# Patient Record
Sex: MALE | Race: WHITE | Employment: OTHER | URBAN - METROPOLITAN AREA
[De-identification: names, ages, dates, MRNs, and addresses within clinical notes are randomized per-mention and may not be internally consistent; named-entity substitution may affect disease eponyms.]

---

## 2020-01-31 ENCOUNTER — APPOINTMENT (OUTPATIENT)
Dept: CT IMAGING | Facility: CLINIC | Age: 70
End: 2020-01-31
Attending: EMERGENCY MEDICINE
Payer: COMMERCIAL

## 2020-01-31 ENCOUNTER — ANESTHESIA (OUTPATIENT)
Dept: SURGERY | Facility: CLINIC | Age: 70
End: 2020-01-31
Payer: COMMERCIAL

## 2020-01-31 ENCOUNTER — ANESTHESIA EVENT (OUTPATIENT)
Dept: SURGERY | Facility: CLINIC | Age: 70
End: 2020-01-31
Payer: COMMERCIAL

## 2020-01-31 ENCOUNTER — HOSPITAL ENCOUNTER (OUTPATIENT)
Facility: CLINIC | Age: 70
Discharge: HOME | End: 2020-01-31
Attending: EMERGENCY MEDICINE | Admitting: SURGERY
Payer: COMMERCIAL

## 2020-01-31 VITALS
SYSTOLIC BLOOD PRESSURE: 155 MMHG | HEART RATE: 51 BPM | BODY MASS INDEX: 28.48 KG/M2 | RESPIRATION RATE: 20 BRPM | TEMPERATURE: 98.4 F | OXYGEN SATURATION: 97 % | DIASTOLIC BLOOD PRESSURE: 76 MMHG | WEIGHT: 210 LBS

## 2020-01-31 DIAGNOSIS — K43.6 INCARCERATED VENTRAL HERNIA: ICD-10-CM

## 2020-01-31 LAB
ALBUMIN SERPL-MCNC: 3.9 G/DL (ref 3.4–5)
ALBUMIN UR-MCNC: NEGATIVE MG/DL
ALP SERPL-CCNC: 85 U/L (ref 40–150)
ALT SERPL W P-5'-P-CCNC: 25 U/L (ref 0–70)
ANION GAP SERPL CALCULATED.3IONS-SCNC: 8 MMOL/L (ref 3–14)
APPEARANCE UR: CLEAR
AST SERPL W P-5'-P-CCNC: 27 U/L (ref 0–45)
BASOPHILS # BLD AUTO: 0.1 10E9/L (ref 0–0.2)
BASOPHILS NFR BLD AUTO: 0.4 %
BILIRUB SERPL-MCNC: 0.5 MG/DL (ref 0.2–1.3)
BILIRUB UR QL STRIP: NEGATIVE
BUN SERPL-MCNC: 21 MG/DL (ref 7–30)
CALCIUM SERPL-MCNC: 9.2 MG/DL (ref 8.5–10.1)
CHLORIDE SERPL-SCNC: 105 MMOL/L (ref 94–109)
CO2 SERPL-SCNC: 26 MMOL/L (ref 20–32)
COLOR UR AUTO: ABNORMAL
CREAT SERPL-MCNC: 0.84 MG/DL (ref 0.66–1.25)
DIFFERENTIAL METHOD BLD: ABNORMAL
EOSINOPHIL # BLD AUTO: 0.1 10E9/L (ref 0–0.7)
EOSINOPHIL NFR BLD AUTO: 0.4 %
ERYTHROCYTE [DISTWIDTH] IN BLOOD BY AUTOMATED COUNT: 11.8 % (ref 10–15)
GFR SERPL CREATININE-BSD FRML MDRD: >90 ML/MIN/{1.73_M2}
GLUCOSE SERPL-MCNC: 152 MG/DL (ref 70–99)
GLUCOSE UR STRIP-MCNC: NEGATIVE MG/DL
HCT VFR BLD AUTO: 44.3 % (ref 40–53)
HGB BLD-MCNC: 15 G/DL (ref 13.3–17.7)
HGB UR QL STRIP: NEGATIVE
IMM GRANULOCYTES # BLD: 0.1 10E9/L (ref 0–0.4)
IMM GRANULOCYTES NFR BLD: 0.5 %
KETONES UR STRIP-MCNC: 40 MG/DL
LEUKOCYTE ESTERASE UR QL STRIP: NEGATIVE
LYMPHOCYTES # BLD AUTO: 1.6 10E9/L (ref 0.8–5.3)
LYMPHOCYTES NFR BLD AUTO: 10 %
MCH RBC QN AUTO: 31.1 PG (ref 26.5–33)
MCHC RBC AUTO-ENTMCNC: 33.9 G/DL (ref 31.5–36.5)
MCV RBC AUTO: 92 FL (ref 78–100)
MONOCYTES # BLD AUTO: 0.5 10E9/L (ref 0–1.3)
MONOCYTES NFR BLD AUTO: 3.3 %
MUCOUS THREADS #/AREA URNS LPF: PRESENT /LPF
NEUTROPHILS # BLD AUTO: 14 10E9/L (ref 1.6–8.3)
NEUTROPHILS NFR BLD AUTO: 85.4 %
NITRATE UR QL: NEGATIVE
NRBC # BLD AUTO: 0 10*3/UL
NRBC BLD AUTO-RTO: 0 /100
PH UR STRIP: 8 PH (ref 5–7)
PLATELET # BLD AUTO: 291 10E9/L (ref 150–450)
POTASSIUM SERPL-SCNC: 3.9 MMOL/L (ref 3.4–5.3)
PROT SERPL-MCNC: 7.8 G/DL (ref 6.8–8.8)
RBC # BLD AUTO: 4.82 10E12/L (ref 4.4–5.9)
RBC #/AREA URNS AUTO: 2 /HPF (ref 0–2)
SODIUM SERPL-SCNC: 139 MMOL/L (ref 133–144)
SOURCE: ABNORMAL
SP GR UR STRIP: 1 (ref 1–1.03)
UROBILINOGEN UR STRIP-MCNC: NORMAL MG/DL (ref 0–2)
WBC # BLD AUTO: 16.4 10E9/L (ref 4–11)
WBC #/AREA URNS AUTO: 13 /HPF (ref 0–5)

## 2020-01-31 PROCEDURE — 71000012 ZZH RECOVERY PHASE 1 LEVEL 1 FIRST HR: Performed by: SURGERY

## 2020-01-31 PROCEDURE — 25000128 H RX IP 250 OP 636: Performed by: EMERGENCY MEDICINE

## 2020-01-31 PROCEDURE — 71000027 ZZH RECOVERY PHASE 2 EACH 15 MINS: Performed by: SURGERY

## 2020-01-31 PROCEDURE — 25000128 H RX IP 250 OP 636: Performed by: SURGERY

## 2020-01-31 PROCEDURE — 25000128 H RX IP 250 OP 636: Performed by: NURSE ANESTHETIST, CERTIFIED REGISTERED

## 2020-01-31 PROCEDURE — 99204 OFFICE O/P NEW MOD 45 MIN: CPT | Mod: 57 | Performed by: SURGERY

## 2020-01-31 PROCEDURE — 49561 ZZHC REPAIR INITIAL INCISIONAL HERNIA; INCARCERATED OR STRANGULATED: CPT | Performed by: SURGERY

## 2020-01-31 PROCEDURE — 49568 ZZHC REPAIR HERNIA WITH MESH: CPT | Performed by: SURGERY

## 2020-01-31 PROCEDURE — 37000008 ZZH ANESTHESIA TECHNICAL FEE, 1ST 30 MIN: Performed by: SURGERY

## 2020-01-31 PROCEDURE — 49561 ZZHC REPAIR INITIAL INCISIONAL HERNIA; INCARCERATED OR STRANGULATED: CPT | Mod: AS | Performed by: PHYSICIAN ASSISTANT

## 2020-01-31 PROCEDURE — 37000009 ZZH ANESTHESIA TECHNICAL FEE, EACH ADDTL 15 MIN: Performed by: SURGERY

## 2020-01-31 PROCEDURE — 74177 CT ABD & PELVIS W/CONTRAST: CPT

## 2020-01-31 PROCEDURE — 40000306 ZZH STATISTIC PRE PROC ASSESS II: Performed by: SURGERY

## 2020-01-31 PROCEDURE — 99285 EMERGENCY DEPT VISIT HI MDM: CPT | Mod: 25

## 2020-01-31 PROCEDURE — 25000125 ZZHC RX 250: Performed by: NURSE ANESTHETIST, CERTIFIED REGISTERED

## 2020-01-31 PROCEDURE — 25800030 ZZH RX IP 258 OP 636: Performed by: ANESTHESIOLOGY

## 2020-01-31 PROCEDURE — 96376 TX/PRO/DX INJ SAME DRUG ADON: CPT

## 2020-01-31 PROCEDURE — 93010 ELECTROCARDIOGRAM REPORT: CPT | Performed by: INTERNAL MEDICINE

## 2020-01-31 PROCEDURE — 25800030 ZZH RX IP 258 OP 636: Performed by: EMERGENCY MEDICINE

## 2020-01-31 PROCEDURE — 96375 TX/PRO/DX INJ NEW DRUG ADDON: CPT | Mod: 59

## 2020-01-31 PROCEDURE — 49568 ZZHC REPAIR HERNIA WITH MESH: CPT | Mod: AS | Performed by: PHYSICIAN ASSISTANT

## 2020-01-31 PROCEDURE — 25000125 ZZHC RX 250: Performed by: EMERGENCY MEDICINE

## 2020-01-31 PROCEDURE — 27210794 ZZH OR GENERAL SUPPLY STERILE: Performed by: SURGERY

## 2020-01-31 PROCEDURE — 36000056 ZZH SURGERY LEVEL 3 1ST 30 MIN: Performed by: SURGERY

## 2020-01-31 PROCEDURE — 85025 COMPLETE CBC W/AUTO DIFF WBC: CPT | Performed by: EMERGENCY MEDICINE

## 2020-01-31 PROCEDURE — 96374 THER/PROPH/DIAG INJ IV PUSH: CPT | Mod: 59

## 2020-01-31 PROCEDURE — 81001 URINALYSIS AUTO W/SCOPE: CPT | Performed by: EMERGENCY MEDICINE

## 2020-01-31 PROCEDURE — 36000058 ZZH SURGERY LEVEL 3 EA 15 ADDTL MIN: Performed by: SURGERY

## 2020-01-31 PROCEDURE — 80053 COMPREHEN METABOLIC PANEL: CPT | Performed by: EMERGENCY MEDICINE

## 2020-01-31 RX ORDER — FENTANYL CITRATE 50 UG/ML
25-50 INJECTION, SOLUTION INTRAMUSCULAR; INTRAVENOUS
Status: DISCONTINUED | OUTPATIENT
Start: 2020-01-31 | End: 2020-01-31 | Stop reason: HOSPADM

## 2020-01-31 RX ORDER — EPHEDRINE SULFATE 50 MG/ML
INJECTION, SOLUTION INTRAVENOUS PRN
Status: DISCONTINUED | OUTPATIENT
Start: 2020-01-31 | End: 2020-01-31

## 2020-01-31 RX ORDER — GLYCOPYRROLATE 0.2 MG/ML
INJECTION, SOLUTION INTRAMUSCULAR; INTRAVENOUS PRN
Status: DISCONTINUED | OUTPATIENT
Start: 2020-01-31 | End: 2020-01-31

## 2020-01-31 RX ORDER — ONDANSETRON 2 MG/ML
4 INJECTION INTRAMUSCULAR; INTRAVENOUS EVERY 30 MIN PRN
Status: DISCONTINUED | OUTPATIENT
Start: 2020-01-31 | End: 2020-01-31 | Stop reason: HOSPADM

## 2020-01-31 RX ORDER — OXYCODONE HYDROCHLORIDE 5 MG/1
5-10 TABLET ORAL EVERY 4 HOURS PRN
Qty: 12 TABLET | Refills: 0 | Status: SHIPPED | OUTPATIENT
Start: 2020-01-31 | End: 2020-02-18

## 2020-01-31 RX ORDER — IOPAMIDOL 755 MG/ML
500 INJECTION, SOLUTION INTRAVASCULAR ONCE
Status: COMPLETED | OUTPATIENT
Start: 2020-01-31 | End: 2020-01-31

## 2020-01-31 RX ORDER — DEXAMETHASONE SODIUM PHOSPHATE 4 MG/ML
INJECTION, SOLUTION INTRA-ARTICULAR; INTRALESIONAL; INTRAMUSCULAR; INTRAVENOUS; SOFT TISSUE PRN
Status: DISCONTINUED | OUTPATIENT
Start: 2020-01-31 | End: 2020-01-31

## 2020-01-31 RX ORDER — CEFAZOLIN SODIUM 1 G/3ML
1 INJECTION, POWDER, FOR SOLUTION INTRAMUSCULAR; INTRAVENOUS SEE ADMIN INSTRUCTIONS
Status: DISCONTINUED | OUTPATIENT
Start: 2020-01-31 | End: 2020-01-31 | Stop reason: HOSPADM

## 2020-01-31 RX ORDER — CEFAZOLIN SODIUM 2 G/100ML
2 INJECTION, SOLUTION INTRAVENOUS
Status: COMPLETED | OUTPATIENT
Start: 2020-01-31 | End: 2020-01-31

## 2020-01-31 RX ORDER — IBUPROFEN 200 MG
600 TABLET ORAL EVERY 6 HOURS PRN
Qty: 100 TABLET | Refills: 0 | Status: SHIPPED | OUTPATIENT
Start: 2020-01-31

## 2020-01-31 RX ORDER — SODIUM CHLORIDE, SODIUM LACTATE, POTASSIUM CHLORIDE, CALCIUM CHLORIDE 600; 310; 30; 20 MG/100ML; MG/100ML; MG/100ML; MG/100ML
INJECTION, SOLUTION INTRAVENOUS CONTINUOUS
Status: DISCONTINUED | OUTPATIENT
Start: 2020-01-31 | End: 2020-01-31 | Stop reason: HOSPADM

## 2020-01-31 RX ORDER — PROPOFOL 10 MG/ML
INJECTION, EMULSION INTRAVENOUS PRN
Status: DISCONTINUED | OUTPATIENT
Start: 2020-01-31 | End: 2020-01-31

## 2020-01-31 RX ORDER — METOPROLOL TARTRATE 1 MG/ML
1-2 INJECTION, SOLUTION INTRAVENOUS EVERY 5 MIN PRN
Status: DISCONTINUED | OUTPATIENT
Start: 2020-01-31 | End: 2020-01-31 | Stop reason: HOSPADM

## 2020-01-31 RX ORDER — MEPERIDINE HYDROCHLORIDE 50 MG/ML
12.5 INJECTION INTRAMUSCULAR; INTRAVENOUS; SUBCUTANEOUS
Status: DISCONTINUED | OUTPATIENT
Start: 2020-01-31 | End: 2020-01-31 | Stop reason: HOSPADM

## 2020-01-31 RX ORDER — OXYCODONE HYDROCHLORIDE 5 MG/1
10 TABLET ORAL
Status: DISCONTINUED | OUTPATIENT
Start: 2020-01-31 | End: 2020-01-31 | Stop reason: HOSPADM

## 2020-01-31 RX ORDER — LIDOCAINE 40 MG/G
CREAM TOPICAL
Status: DISCONTINUED | OUTPATIENT
Start: 2020-01-31 | End: 2020-01-31 | Stop reason: HOSPADM

## 2020-01-31 RX ORDER — DIMENHYDRINATE 50 MG/ML
25 INJECTION, SOLUTION INTRAMUSCULAR; INTRAVENOUS
Status: DISCONTINUED | OUTPATIENT
Start: 2020-01-31 | End: 2020-01-31 | Stop reason: HOSPADM

## 2020-01-31 RX ORDER — ALBUTEROL SULFATE 0.83 MG/ML
2.5 SOLUTION RESPIRATORY (INHALATION) EVERY 4 HOURS PRN
Status: DISCONTINUED | OUTPATIENT
Start: 2020-01-31 | End: 2020-01-31 | Stop reason: HOSPADM

## 2020-01-31 RX ORDER — LIDOCAINE HYDROCHLORIDE 10 MG/ML
INJECTION, SOLUTION INFILTRATION; PERINEURAL PRN
Status: DISCONTINUED | OUTPATIENT
Start: 2020-01-31 | End: 2020-01-31

## 2020-01-31 RX ORDER — NEOSTIGMINE METHYLSULFATE 1 MG/ML
VIAL (ML) INJECTION PRN
Status: DISCONTINUED | OUTPATIENT
Start: 2020-01-31 | End: 2020-01-31

## 2020-01-31 RX ORDER — CALCIUM CARBONATE 500(1250)
1 TABLET ORAL DAILY
COMMUNITY

## 2020-01-31 RX ORDER — FENTANYL CITRATE 50 UG/ML
INJECTION, SOLUTION INTRAMUSCULAR; INTRAVENOUS PRN
Status: DISCONTINUED | OUTPATIENT
Start: 2020-01-31 | End: 2020-01-31

## 2020-01-31 RX ORDER — NALOXONE HYDROCHLORIDE 0.4 MG/ML
.1-.4 INJECTION, SOLUTION INTRAMUSCULAR; INTRAVENOUS; SUBCUTANEOUS
Status: DISCONTINUED | OUTPATIENT
Start: 2020-01-31 | End: 2020-01-31 | Stop reason: HOSPADM

## 2020-01-31 RX ORDER — SODIUM CHLORIDE 9 MG/ML
INJECTION, SOLUTION INTRAVENOUS CONTINUOUS
Status: DISCONTINUED | OUTPATIENT
Start: 2020-01-31 | End: 2020-01-31 | Stop reason: HOSPADM

## 2020-01-31 RX ORDER — HYDROMORPHONE HYDROCHLORIDE 1 MG/ML
0.5 INJECTION, SOLUTION INTRAMUSCULAR; INTRAVENOUS; SUBCUTANEOUS
Status: DISCONTINUED | OUTPATIENT
Start: 2020-01-31 | End: 2020-01-31 | Stop reason: HOSPADM

## 2020-01-31 RX ORDER — BUPIVACAINE HYDROCHLORIDE 5 MG/ML
INJECTION, SOLUTION EPIDURAL; INTRACAUDAL PRN
Status: DISCONTINUED | OUTPATIENT
Start: 2020-01-31 | End: 2020-01-31 | Stop reason: HOSPADM

## 2020-01-31 RX ORDER — ONDANSETRON 2 MG/ML
INJECTION INTRAMUSCULAR; INTRAVENOUS PRN
Status: DISCONTINUED | OUTPATIENT
Start: 2020-01-31 | End: 2020-01-31

## 2020-01-31 RX ORDER — ACETAMINOPHEN 325 MG/1
650 TABLET ORAL
Status: DISCONTINUED | OUTPATIENT
Start: 2020-01-31 | End: 2020-01-31 | Stop reason: HOSPADM

## 2020-01-31 RX ORDER — HYDROMORPHONE HYDROCHLORIDE 1 MG/ML
.3-.5 INJECTION, SOLUTION INTRAMUSCULAR; INTRAVENOUS; SUBCUTANEOUS EVERY 10 MIN PRN
Status: DISCONTINUED | OUTPATIENT
Start: 2020-01-31 | End: 2020-01-31 | Stop reason: HOSPADM

## 2020-01-31 RX ORDER — ACETAMINOPHEN 325 MG/1
975 TABLET ORAL EVERY 6 HOURS PRN
Qty: 100 TABLET | Refills: 0 | Status: SHIPPED | OUTPATIENT
Start: 2020-01-31

## 2020-01-31 RX ORDER — ONDANSETRON 4 MG/1
4 TABLET, ORALLY DISINTEGRATING ORAL EVERY 30 MIN PRN
Status: DISCONTINUED | OUTPATIENT
Start: 2020-01-31 | End: 2020-01-31 | Stop reason: HOSPADM

## 2020-01-31 RX ORDER — AMOXICILLIN 250 MG
1-2 CAPSULE ORAL 2 TIMES DAILY
Qty: 30 TABLET | Refills: 0 | Status: SHIPPED | OUTPATIENT
Start: 2020-01-31 | End: 2020-02-18

## 2020-01-31 RX ADMIN — IOPAMIDOL 100 ML: 755 INJECTION, SOLUTION INTRAVENOUS at 10:40

## 2020-01-31 RX ADMIN — GLYCOPYRROLATE 0.6 MG: 0.2 INJECTION, SOLUTION INTRAMUSCULAR; INTRAVENOUS at 15:19

## 2020-01-31 RX ADMIN — LIDOCAINE HYDROCHLORIDE 30 MG: 10 INJECTION, SOLUTION INFILTRATION; PERINEURAL at 14:33

## 2020-01-31 RX ADMIN — SODIUM CHLORIDE, POTASSIUM CHLORIDE, SODIUM LACTATE AND CALCIUM CHLORIDE: 600; 310; 30; 20 INJECTION, SOLUTION INTRAVENOUS at 13:49

## 2020-01-31 RX ADMIN — PROPOFOL 150 MG: 10 INJECTION, EMULSION INTRAVENOUS at 14:33

## 2020-01-31 RX ADMIN — FENTANYL CITRATE 100 MCG: 50 INJECTION, SOLUTION INTRAMUSCULAR; INTRAVENOUS at 14:54

## 2020-01-31 RX ADMIN — CEFAZOLIN SODIUM 2 G: 2 INJECTION, SOLUTION INTRAVENOUS at 14:28

## 2020-01-31 RX ADMIN — HYDROMORPHONE HYDROCHLORIDE 0.5 MG: 1 INJECTION, SOLUTION INTRAMUSCULAR; INTRAVENOUS; SUBCUTANEOUS at 10:28

## 2020-01-31 RX ADMIN — SODIUM CHLORIDE: 9 INJECTION, SOLUTION INTRAVENOUS at 10:28

## 2020-01-31 RX ADMIN — ONDANSETRON HYDROCHLORIDE 4 MG: 2 INJECTION, SOLUTION INTRAVENOUS at 15:19

## 2020-01-31 RX ADMIN — FENTANYL CITRATE 100 MCG: 50 INJECTION, SOLUTION INTRAMUSCULAR; INTRAVENOUS at 14:33

## 2020-01-31 RX ADMIN — MIDAZOLAM 2 MG: 1 INJECTION INTRAMUSCULAR; INTRAVENOUS at 14:27

## 2020-01-31 RX ADMIN — EPHEDRINE SULFATE 10 MG: 50 INJECTION, SOLUTION INTRAVENOUS at 15:08

## 2020-01-31 RX ADMIN — Medication 3 MG: at 15:19

## 2020-01-31 RX ADMIN — ONDANSETRON HYDROCHLORIDE 4 MG: 2 INJECTION, SOLUTION INTRAMUSCULAR; INTRAVENOUS at 10:28

## 2020-01-31 RX ADMIN — ROCURONIUM BROMIDE 20 MG: 10 INJECTION INTRAVENOUS at 14:49

## 2020-01-31 RX ADMIN — GLYCOPYRROLATE 0.2 MG: 0.2 INJECTION, SOLUTION INTRAMUSCULAR; INTRAVENOUS at 14:33

## 2020-01-31 RX ADMIN — SODIUM CHLORIDE 65 ML: 9 INJECTION, SOLUTION INTRAVENOUS at 10:40

## 2020-01-31 RX ADMIN — DEXAMETHASONE SODIUM PHOSPHATE 8 MG: 4 INJECTION, SOLUTION INTRA-ARTICULAR; INTRALESIONAL; INTRAMUSCULAR; INTRAVENOUS; SOFT TISSUE at 14:33

## 2020-01-31 RX ADMIN — SODIUM CHLORIDE, POTASSIUM CHLORIDE, SODIUM LACTATE AND CALCIUM CHLORIDE: 600; 310; 30; 20 INJECTION, SOLUTION INTRAVENOUS at 14:56

## 2020-01-31 ASSESSMENT — ENCOUNTER SYMPTOMS
VOMITING: 1
NAUSEA: 1
ABDOMINAL PAIN: 1
COUGH: 1

## 2020-01-31 NOTE — DISCHARGE INSTRUCTIONS
GENERAL ANESTHESIA OR SEDATION ADULT DISCHARGE INSTRUCTIONS   SPECIAL PRECAUTIONS FOR 24 HOURS AFTER SURGERY    IT IS NOT UNUSUAL TO FEEL LIGHT-HEADED OR FAINT, UP TO 24 HOURS AFTER SURGERY OR WHILE TAKING PAIN MEDICATION.  IF YOU HAVE THESE SYMPTOMS; SIT FOR A FEW MINUTES BEFORE STANDING AND HAVE SOMEONE ASSIST YOU WHEN YOU GET UP TO WALK OR USE THE BATHROOM.    YOU SHOULD REST AND RELAX FOR THE NEXT 24 HOURS AND YOU MUST MAKE ARRANGEMENTS TO HAVE SOMEONE STAY WITH YOU FOR AT LEAST 24 HOURS AFTER YOUR DISCHARGE.  AVOID HAZARDOUS AND STRENUOUS ACTIVITIES.  DO NOT MAKE IMPORTANT DECISIONS FOR 24 HOURS.    DO NOT DRIVE ANY VEHICLE OR OPERATE MECHANICAL EQUIPMENT FOR 24 HOURS FOLLOWING THE END OF YOUR SURGERY.  EVEN THOUGH YOU MAY FEEL NORMAL, YOUR REACTIONS MAY BE AFFECTED BY THE MEDICATION YOU HAVE RECEIVED.    DO NOT DRINK ALCOHOLIC BEVERAGES FOR 24 HOURS FOLLOWING YOUR SURGERY.    DRINK CLEAR LIQUIDS (APPLE JUICE, GINGER ALE, 7-UP, BROTH, ETC.).  PROGRESS TO YOUR REGULAR DIET AS YOU FEEL ABLE.    YOU MAY HAVE A DRY MOUTH, A SORE THROAT, MUSCLES ACHES OR TROUBLE SLEEPING.  THESE SHOULD GO AWAY AFTER 24 HOURS.    CALL YOUR DOCTOR FOR ANY OF THE FOLLOWING:  SIGNS OF INFECTION (FEVER, GROWING TENDERNESS AT THE SURGERY SITE, A LARGE AMOUNT OF DRAINAGE OR BLEEDING, SEVERE PAIN, FOUL-SMELLING DRAINAGE, REDNESS OR SWELLING.    IT HAS BEEN OVER 8 TO 10 HOURS SINCE SURGERY AND YOU ARE STILL NOT ABLE TO URINATE (PASS WATER).

## 2020-01-31 NOTE — ED TRIAGE NOTES
Patient reports sudden onset abdominal pain that started this morning at about 0715 after coughing. He reports he felt like he pulled a muscle in his abdomen, pain worsening. Grapefruit sized firm distention noted just above umbilicus. Patent with tachypnea states it hurts to breath. Worse with any movement.

## 2020-01-31 NOTE — PHARMACY-ADMISSION MEDICATION HISTORY
Admission medication history interview status for this patient is complete. See [facility] admission navigator for allergy information, prior to admission medications and immunization status.     Medication history interview source(s):Patient  Medication history resources (including written lists, pill bottles, clinic record):None    Changes made to PTA medication list:  Added: calcium  Deleted: cetirizine, omega-3 fatty acids  Changed: vitamin D dose    Actions taken by pharmacist (provider contacted, etc):None     Additional medication history information:None    Medication reconciliation/reorder completed by provider prior to medication history? No    Prior to Admission medications    Medication Sig Last Dose Taking? Auth Provider   ASPIRIN 81 MG OR CPEP 1 CAPSULE DAILY 1/30/2020 at Unknown time Yes Reported, Patient   calcium carbonate (OS-RITIKA) 500 MG tablet Take 1 tablet by mouth daily 1/30/2020 Yes Unknown, Entered By History   MULTIVITAMINS OR Take 1 tablet by mouth daily  1/30/2020 at Unknown time Yes Reported, Patient   VITAMIN D 1000 UNIT OR TABS Take 2,000 Units by mouth daily  1/30/2020 at Unknown time Yes Reported, Patient

## 2020-01-31 NOTE — ANESTHESIA POSTPROCEDURE EVALUATION
Patient: Kimo Araya    Procedure(s):  VENTRAL HERNIA REPAIR    Diagnosis:Incarcerated ventral hernia [K43.6]  Diagnosis Additional Information: No value filed.    Anesthesia Type:  General, LMA    Note:  Anesthesia Post Evaluation    Patient location during evaluation: PACU  Patient participation: Able to fully participate in evaluation  Level of consciousness: awake and alert  Pain management: adequate  multimodal analgesia used between 6 hours prior to anesthesia start to PACU dischargeAirway patency: patent  Cardiovascular status: acceptable  Respiratory status: acceptable  two or more mitigation strategies used for obstructive sleep apneaHydration status: acceptable  PONV: none     Anesthetic complications: None          Last vitals:  Vitals:    01/31/20 1550 01/31/20 1601 01/31/20 1620   BP: (!) 142/71 (!) 143/75 (!) 155/76   Pulse: 63  51   Resp: 19 10 20   Temp: 97.2  F (36.2  C)  98.4  F (36.9  C)   SpO2: 100%  97%         Electronically Signed By: Kan Bullard MD  January 31, 2020  4:53 PM

## 2020-01-31 NOTE — CONSULTS
[facility]  General Surgery Consultation    Assessment:   Kimo Araya is a 69 year old male with a new ventral, likely epigastric hernia. Initially, he had a loop of small bowel within the hernia. He was unable to reduce it. However, after administration of pain medication, I was able to reduce it in the ED with minimal effort.     Plan:    We will schedule a open ventral hernia today to prevent future incarceration episodes. We discussed the pros and cons of using mesh in this situation. While I usually prefer to use hernia mesh to prevent recurrence, in this case of formerly incarcerated bowel with a leukocytosis, I plan to simply repair the hernia primarily. We have had a detailed discussion regarding the nature of ventral hernias. We discussed the surgery, indications, alternatives, risks, benefits, incisions, scarring, anesthesia, recovery, infection, bleeding, numbness, nerve damage and chronic pain, hernia recurrence, lifting and activity limitations after surgery.  All questions have been answered to the best of my ability.    To OR this afternoon for ventral hernia repair  Recommended time off lifting 20 lb:   6 wks    HPI:  Kimo Araya is a 69 year old male who presents for evaluation of pain and a lump in the supraumbilical region which occurred suddenly this morning. He is recovering from an upper respiratory illness and was coughing, then had sudden pain and a lump in his upper abdomen. The lump was quite painful. After an hour or two, he started having nausea and dry heaves. He presented to the ED, where a CT showed a ventral hernia in the supraumbilical/epigastric location with an incarcerated loop of small bowel. On initial evaluation in the ED, he was having sharp pain, but he is feeling much better after getting some dilaudid.     Prior incarceration:  No   Nausea/vomitting/bloating:  Yes   Bulge/mass:  Yes    Previous herniorrhaphy:  No     Constipation: No  Dysuria: No  Cough:  Yes  Diabetes: No  Current smoker: No    Heavy lifting > 20 lb: No    Past Medical History:  Past Medical History:   Diagnosis Date     Actinic keratoses on his face. 2008     Allergic rhinitis, cause unspecified     Allergy, airborne subst       Current Facility-Administered Medications   Medication     HYDROmorphone (PF) (DILAUDID) injection 0.5 mg     ondansetron (ZOFRAN) injection 4 mg     sodium chloride 0.9% infusion     Current Outpatient Medications   Medication     ASPIRIN 81 MG OR CPEP     LORATADINE 10 MG OR TABS     MULTIVITAMINS OR     OMEGA-3 FATTY ACIDS 1000 MG OR CAPS     VITAMIN D 1000 UNIT OR TABS        Past Surgical History:  Past Surgical History:   Procedure Laterality Date     HC COLONOSCOPY W/WO BRUSH/WASH  4/27/07        Social History:  Social History     Tobacco Use     Smoking status: Never Smoker     Tobacco comment: no 2nd hand   Substance Use Topics     Alcohol use: No     Drug use: Not on file      Lives in [place]. . Here today with his daughter, Jesusita.   Occupation: Retired.     Family History:  Family History   Problem Relation Age of Onset     Eye Disorder Brother         MELANOMA     Eye Disorder Mother         CATARACTS     Diabetes No family hx of      Hypertension Father         BYPASS     Cerebrovascular Disease No family hx of      Breast Cancer No family hx of      Cancer - colorectal No family hx of      Prostate Cancer No family hx of      Heart Disease No family hx of      Thyroid Disease No family hx of      Lipids No family hx of      No Family history of bleeding or clotting disorders or reactions to anesthesia    ROS:  The 10 point review of systems is negative other than noted in the HPI and above.    PE:    Vitals: BP (!) 145/77   Pulse 68   Temp 96.9  F (36.1  C) (Temporal)   Resp (!) 40   Wt 95.3 kg (210 lb)   SpO2 98%   BMI 28.48 kg/m    BMI= Body mass index is 28.48 kg/m .  General- Well-developed, well-nourished, comfortable  HEENT- Mucous membranes  moist.  Sclera are nonicteric.  Lymph -  No inguinal lymphadenopathy or masses   Respiratory- regular and non labored  CV - regular rate and rhythm  Abdomen- abdomen is soft without significant tenderness, masses, organomegaly or guarding, no umbilical hernia  Hernia- There is a palpable mass in the epigastric region. Minimally tender. With palpation, there are bowel sounds. With gently reduction technique, the small bowel was reduce back into the peritoneal cavity. The hernia defect feels to be approximately 3-4 cm  Extremities- without edema  Psych- mood and affect are appropriate  Neurologic- alert, speech is clear, moves all extremities with good strength  Integument- without lesions, rashes, or jaundice    This note may have been created using voice recognition software. Undetected word substitutions or other errors may have occurred.     Time spent with the patient with greater that 50% of the time in discussion was 25 minutes.     Love Trejo MD  01/31/20 12:30 PM

## 2020-01-31 NOTE — ED PROVIDER NOTES
History     Chief Complaint:  Abdominal Pain      HPI   Kimo Araya is a 69 year old male who presents with abdominal pain. The patient notes that over the past 4-5 days he has been on Dayquil for cold and sinus symptoms. He reports that this morning around 0715 this morning when he had coughed and felt sudden simultaneous pulling around his naval region. He then notes that he had pain around the area and had to sit for a few minutes. His daughter then notes that he had brought his grandson to school after that and that on the way home he had felt nauseous. He notes that when he got home he had went to the bathroom and had a BM and felt he needed to vomit but notes he had just had dry heaving bouts with some here in the ED as well. He also states he has diffuse abdominal pain. He denies history of similar pain and or pain meds since onset.       Allergies:  No known drug allergies     Medications:    Aspirin 81 mg  Loratadine    Past Medical History:    Mixed hyperlipidemia  Allergic rhinitis    Past Surgical History:    The patient does not have any pertinent past surgical history.     Family History:    Eye disorder, brother and mother  Hypertension, father  Bypass, father    Social History:  Smoking status: Never smoker  Alcohol use: No  Drug use: Not on file  PCP: Jimmy Khanna  Presents to the ED with daughter   Marital Status:   [2]    Review of Systems   Respiratory: Positive for cough.    Gastrointestinal: Positive for abdominal pain, nausea and vomiting.   All other systems reviewed and are negative.    Physical Exam     Patient Vitals for the past 24 hrs:   BP Temp Temp src Pulse Heart Rate Resp SpO2 Weight   01/31/20 1258 (!) 145/82 98  F (36.7  C) Temporal -- -- -- 98 % --   01/31/20 1200 (!) 145/77 -- -- 68 -- -- 98 % --   01/31/20 1035 (!) 162/110 -- -- 66 -- -- 99 % --   01/31/20 0951 (!) 151/85 96.9  F (36.1  C) Temporal 51 51 (!) 40 98 % 95.3 kg (210 lb)        Physical Exam  General:  Patient is alert and cooperative.  HENT:  Normal nose, oropharynx. Moist oral mucosa.  Eyes: EOMI. Normal conjunctiva.  Neck:  Normal range of motion and appearance.   Cardiovascular:  Normal rate, regular rhythm.   Pulmonary/Chest:  Effort normal. No wheezing or crackles.  Abdominal: supraumbilical midline area of firm, tender swelling.  Musculoskeletal: Normal range of motion. No edema or tenderness.   Neurological: oriented, normal strength, sensation, and coordination.   Skin: Warm and dry. No rash or bruising.   Psychiatric: Normal mood and affect. Normal behavior and judgement.    Emergency Department Course     Imaging:  Radiology findings were communicated with the patient who voiced understanding of the findings.    CT Abdomen/Pelvis w contrast:   1. Obstructing midline ventral hernia containing a loop of small  bowel. Follow-up with general surgery for further assessment.  2. Parapelvic renal cysts. No hydronephrosis or obvious urinary tract  calculi.    Reading per radiology      Laboratory:  Laboratory findings were communicated with the patient who voiced understanding of the findings.    CBC: WBC 16.4(H), HGB 15.0,   CMP: Glucose 152(H) o/w WNL (Creatinine 0.84)  UA with micro: e[place]on 40 (A). PH 8.0 (H), mucous present(A) o/w negative       Procedures    Interventions:  1028 NS 1L IV Bolus   1028 Dilaudid 0.5mg IV injection  1028 Zofran 4mg IV injection    1255 Zofran 4mg IV injection     Emergency Department Course:   Nursing notes and vitals reviewed.    1013 I performed an exam of the patient as documented above.     1003 IV was inserted and blood was drawn for laboratory testing, results above.     1039 The patient was sent for a CT Abdomen/Pelvis while in the emergency department, results above.     1051 I spoke with Dr. Trejo of the General Surgery service at [place] regarding patient's presentation, findings, and plan of care. Dr. Trejo notes that he is coming to see the patient.      1127 The patient provided a urine sample here in the emergency department. This was sent for laboratory testing, findings above.     1152 I personally reviewed the results with the patient and answered all related questions prior to admission for surgery.    Dr. Trejo accepts the patient for admission.      Impression & Plan      Medical Decision Making:  Kimo Araya is a 69 year old male who presents to the emergency department today for evaluation of acute abdominal pain sustained this morning after a vigorous coughing episode.  Examination reveals an area of firm swelling and tenderness in the supraumbilical region.  A CT scan was performed which demonstrates a ventral wall hernia containing a loop of small bowel.  He was made n.p.o., a peripheral IV established, and he was medicated with IV Zofran and Dilaudid on arrival.  Laboratory tests are unremarkable.  A general surgery consultation was obtained.  He was assessed in the emergency department, the hernia was reduced and recommendations were made for operative repair later today with anticipated discharge home afterwards.        Diagnosis:    ICD-10-CM    1. Incarcerated ventral hernia K43.6 UA with Microscopic       Disposition:   The patient is admitted into the care of Dr. Trejo.     Scribe Disclosure:  SHANDA, Lucy Longo, am serving as a scribe at 1013 on 1/31/2020 to document services personally performed by Cullen Richardson MD based on my observations and the provider's statements to me.  [facility] EMERGENCY DEPARTMENT       Cullen Richardson MD  01/31/20 0692

## 2020-01-31 NOTE — ANESTHESIA PREPROCEDURE EVALUATION
Anesthesia Pre-Procedure Evaluation    Patient: Kimo Araya   MRN: [id number] : 1950          Preoperative Diagnosis: Incarcerated ventral hernia [K43.6]    Procedure(s):  HERNIORRHAPHY,INCARCERATED  VENTRAL, OPEN    Past Medical History:   Diagnosis Date     Actinic keratoses on his face.      Allergic rhinitis, cause unspecified     Allergy, airborne subst     Past Surgical History:   Procedure Laterality Date     HC COLONOSCOPY W/WO BRUSH/WASH  07     Anesthesia Evaluation     .             ROS/MED HX    ENT/Pulmonary:  - neg pulmonary ROS     Neurologic:  - neg neurologic ROS     Cardiovascular:     (+) Dyslipidemia, ----. : . . . :. .       METS/Exercise Tolerance:     Hematologic:  - neg hematologic  ROS       Musculoskeletal:  - neg musculoskeletal ROS       GI/Hepatic:  - neg GI/hepatic ROS       Renal/Genitourinary:  - ROS Renal section negative       Endo: Comment: .Body mass index is 28.48 kg/m .   - neg endo ROS       Psychiatric:  - neg psychiatric ROS       Infectious Disease:  - neg infectious disease ROS       Malignancy:         Other: Comment: .Body mass index is 28.48 kg/m .                           Physical Exam  Normal systems: cardiovascular and pulmonary    Airway   Mallampati: II    Dental     Cardiovascular   Rhythm and rate: regular and normal      Pulmonary    breath sounds clear to auscultation            Lab Results   Component Value Date    WBC 16.4 (H) 2020    HGB 15.0 2020    HCT 44.3 2020     2020     2020    POTASSIUM 3.9 2020    CHLORIDE 105 2020    CO2 26 2020    BUN 21 2020    CR 0.84 2020     (H) 2020    RITIKA 9.2 2020    ALBUMIN 3.9 2020    PROTTOTAL 7.8 2020    ALT 25 2020    AST 27 2020    ALKPHOS 85 2020    BILITOTAL 0.5 2020    TSH 1.94 2008       Preop Vitals  BP Readings from Last 3 Encounters:   20 (!) 145/82    01/07/11 138/72   03/23/10 128/84    Pulse Readings from Last 3 Encounters:   01/31/20 68   01/07/11 60   03/23/10 117      Resp Readings from Last 3 Encounters:   01/31/20 (!) 40   10/31/08 16    SpO2 Readings from Last 3 Encounters:   01/31/20 98%   03/16/10 97%      Temp Readings from Last 1 Encounters:   01/31/20 98  F (36.7  C) (Temporal)    Ht Readings from Last 1 Encounters:   01/07/11 1.829 m (6')      Wt Readings from Last 1 Encounters:   01/31/20 95.3 kg (210 lb)    Estimated body mass index is 28.48 kg/m  as calculated from the following:    Height as of 1/7/11: 1.829 m (6').    Weight as of this encounter: 95.3 kg (210 lb).       Anesthesia Plan      History & Physical Review  History and physical reviewed and following examination; no interval change.    ASA Status:  2 .        Plan for General and LMA with Intravenous induction. Maintenance will be Inhalation and Balanced.    PONV prophylaxis:  Ondansetron (or other 5HT-3) and Dexamethasone or Solumedrol       Postoperative Care  Postoperative pain management:  IV analgesics, Oral pain medications and Multi-modal analgesia.      Consents  Anesthetic plan, risks, benefits and alternatives discussed with:  Patient or representative..                 Syd Lee DO                    .

## 2020-02-01 ENCOUNTER — HOSPITAL ENCOUNTER (EMERGENCY)
Facility: CLINIC | Age: 70
Discharge: HOME | End: 2020-02-01
Attending: EMERGENCY MEDICINE | Admitting: EMERGENCY MEDICINE
Payer: COMMERCIAL

## 2020-02-01 ENCOUNTER — NURSE TRIAGE (OUTPATIENT)
Dept: NURSING | Facility: CLINIC | Age: 70
End: 2020-02-01

## 2020-02-01 VITALS
TEMPERATURE: 98.2 F | OXYGEN SATURATION: 96 % | HEART RATE: 68 BPM | RESPIRATION RATE: 18 BRPM | SYSTOLIC BLOOD PRESSURE: 189 MMHG | DIASTOLIC BLOOD PRESSURE: 96 MMHG

## 2020-02-01 DIAGNOSIS — R33.9 URINARY RETENTION: ICD-10-CM

## 2020-02-01 LAB
ALBUMIN UR-MCNC: NEGATIVE MG/DL
APPEARANCE UR: CLEAR
BILIRUB UR QL STRIP: NEGATIVE
COLOR UR AUTO: ABNORMAL
GLUCOSE UR STRIP-MCNC: 30 MG/DL
HGB UR QL STRIP: ABNORMAL
KETONES UR STRIP-MCNC: NEGATIVE MG/DL
LEUKOCYTE ESTERASE UR QL STRIP: NEGATIVE
NITRATE UR QL: NEGATIVE
PH UR STRIP: 5.5 PH (ref 5–7)
RBC #/AREA URNS AUTO: 1 /HPF (ref 0–2)
SOURCE: ABNORMAL
SP GR UR STRIP: 1.01 (ref 1–1.03)
UROBILINOGEN UR STRIP-MCNC: NORMAL MG/DL (ref 0–2)
WBC #/AREA URNS AUTO: 1 /HPF (ref 0–5)

## 2020-02-01 PROCEDURE — 51702 INSERT TEMP BLADDER CATH: CPT

## 2020-02-01 PROCEDURE — 51798 US URINE CAPACITY MEASURE: CPT

## 2020-02-01 PROCEDURE — 81001 URINALYSIS AUTO W/SCOPE: CPT | Performed by: EMERGENCY MEDICINE

## 2020-02-01 PROCEDURE — 99284 EMERGENCY DEPT VISIT MOD MDM: CPT

## 2020-02-01 RX ORDER — TAMSULOSIN HYDROCHLORIDE 0.4 MG/1
0.4 CAPSULE ORAL DAILY
Qty: 7 CAPSULE | Refills: 0 | Status: SHIPPED | OUTPATIENT
Start: 2020-02-01 | End: 2020-02-08

## 2020-02-01 RX ORDER — LIDOCAINE HYDROCHLORIDE 20 MG/ML
JELLY TOPICAL
Status: DISCONTINUED
Start: 2020-02-01 | End: 2020-02-02 | Stop reason: HOSPADM

## 2020-02-01 NOTE — ED AVS SNAPSHOT
[facility] Emergency Department  [address]  [facility] [address]  Phone:  [phone]  Fax:  [phone]                                    Kimo Araya   MRN: [id number]    Department:  [facility] Emergency Department   Date of Visit:  2/1/2020           After Visit Summary Signature Page    I have received my discharge instructions, and my questions have been answered. I have discussed any challenges I see with this plan with the nurse or doctor.    ..........................................................................................................................................  Patient/Patient Representative Signature      ..........................................................................................................................................  Patient Representative Print Name and Relationship to Patient    ..................................................               ................................................  Date                                   Time    ..........................................................................................................................................  Reviewed by Signature/Title    ...................................................              ..............................................  Date                                               Time          22EPIC Rev 08/18

## 2020-02-02 NOTE — ED PROVIDER NOTES
History     Chief Complaint:  Urinary Retention    The history is provided by the patient.      Kimo Araya is a 69 year old male, one day status post ventral hernia repair with Dr. Trejo, who presents with urinary retention. He attest to decreased urine output since discharge yesterday and complains of abdominal distention. Kimo affirms urinary urgency and states he last void normally yesterday at 1430. Patient denies any back pain, penile pain, dysuria, hematuria, fever, emesis, diarrhea, change in PO, or use of thinners. He does note taking one dose of Oxycodone today and denies any concerns of his surgical sites. He denies any history of prostate complications.     Allergies:  No Known Drug Allergies  Seasonal     Medications:    Baby Aspirin     Past Medical History:    Actinic keratoses on his face  Hyperlipidemia    Past Surgical History:    Ventral hernia repair    Family History:    Cataracts  CABG  HTN    Social History:  Never Smoker  Alcohol Use: No  Marital Status:       Review of Systems   All other systems reviewed and are negative.    Physical Exam     Patient Vitals for the past 24 hrs:   BP Temp Temp src Pulse Resp SpO2   02/01/20 2214 -- 98.2  F (36.8  C) Oral 68 18 96 %   02/01/20 2212 (!) 189/96 -- -- -- -- --     Physical Exam  General: Resting on the bed.  Head: No obvious trauma to head.  Ears, Nose, Throat:  External ears normal.  Nose normal.    Eyes:  Conjunctivae clear.    Neck: Normal range of motion.  Neck supple.   CV: Regular rate and rhythm.  No murmurs.      Respiratory: Effort normal and breath sounds normal.  No wheezing or crackles.   Gastrointestinal: Soft.  mild distension. There is no tenderness. Dressing at midline C/D/I.  Musculoskeletal: No cva tenderness  Neuro: Alert. Moving all extremities appropriately.  Normal speech.    Skin: Skin is warm and dry.  No rash noted.     Emergency Department Course     Laboratory:   UA: glc 30 (A) blood trace (A) WBC 1 RBC  1 o/w WNL     Emergency Department Course:  Nursing notes and vitals reviewed.  2210 I performed an exam of the patient as documented above.   2217 Per ER-T, patient has 800 ml per bladder scan.   2236 The patient provided a urine sample here in the emergency department. This was sent for laboratory testing, findings above.  2245 Per ER-T, patient had about 1L of urine removed.    2249 Findings and plan explained to the patient. Patient discharged home with instructions regarding supportive care, medications, and reasons to return. The importance of close follow-up was reviewed.     Impression & Plan      Medical Decision Making:  Kimo Araya is a 69 year old male, one day status post ventral hernia repair, who presents for evaluation of abdominal distention and decreased urinary output.  I considered a broad differential including diverticulitis, aneurysm, urinary retention, ureterolithiasis, UTI, pyelonephritis, neurologic causes (MS, cauda equina,etc), colitis, etc.  The history and exam are consistent with acute urinary retention and this is confirmed after june catheter placement.  A urinalysis was obtained and was negative for any infection.  No significant blood, no flank pain, do not suspect nephrolithiasis.  Will send home with catheter and have patient followup with urology in 3-5 days.  The cause of the acute urinary retention is likely secondary to opiate medication and a recent operative procedure with anesthesia at this point. I did consider that this may be caused by other medications, constipation, prostate issues, bladder or urethral tumor, ureterolithaisis, etc. no suggestion of acute spinal cord pathology, neurologic cause, etc.  Patient is alert well-appearing nontoxic.  He feels much better after June catheter placement.  Medications for discharge noted above.  Patient is stable for discharge home.      Diagnosis:    ICD-10-CM   1. Urinary retention R33.9     Disposition: discharged to  home    Discharge Medications:  Discharge Medication List as of 2/1/2020 11:08 PM      START taking these medications    Details   tamsulosin (FLOMAX) 0.4 MG capsule Take 1 capsule (0.4 mg) by mouth daily for 7 days, Disp-7 capsule, R-0, Local Print           Scribe Disclosure:   Miguel LAND, am serving as a scribe at 10:14 PM on 2/1/2020 to document services personally performed by Salima Virk MD based on my observations and the provider's statements to me.     [facility] EMERGENCY DEPARTMENT       Salima Virk MD  02/02/20 [id number]

## 2020-02-02 NOTE — ED NOTES
Pt discharged home. Verbal and written instructions given and explained. All questions answered. Pt provided catheter teaching prior to discharge by ERT and RN. Pt verbalized understanding.

## 2020-02-02 NOTE — ED TRIAGE NOTES
"Patient presents with complaints of urine retention. Patient states that he had a hernia repair done yesterday. Last time he was able to void normally was yesterday at 1430. Denies any blood in urine or pain. Patient describes \"pressure\". BC intact without need for intervention at this time.   "

## 2020-02-02 NOTE — TELEPHONE ENCOUNTER
Hernia surgery yesterday, and has not been able to empty his bladder.  Has only gone dribbles and feels distended.  Will go to ER per guidelines.    Marcela Castro RN  [facility]      Reason for Disposition    [1] Unable to urinate (or only a few drops) > 4 hours AND     [2] bladder feels very full (e.g., palpable bladder or strong urge to urinate)    Additional Information    Negative: Sounds like a life-threatening emergency to the triager    Negative: [1] Widespread rash AND [2] bright red, sunburn-like    Negative: [1] SEVERE headache AND [2] after spinal (epidural) anesthesia    Negative: [1] Vomiting AND [2] persists > 4 hours    Negative: [1] Vomiting AND [2] abdomen looks much more swollen than usual    Negative: [1] Drinking very little AND [2] dehydration suspected (e.g., no urine > 12 hours, very dry mouth, very lightheaded)    Negative: Patient sounds very sick or weak to the triager    Negative: Sounds like a serious complication to the triager    Negative: Fever > 100.5 F (38.1 C)    Negative: Shock suspected (e.g., cold/pale/clammy skin, too weak to stand, low BP, rapid pulse)    Negative: Sounds like a life-threatening emergency to the triager    Protocols used: URINARY SYMPTOMS-A-AH, POST-OP SYMPTOMS AND HDCPQIYTE-E-IN

## 2020-02-02 NOTE — DISCHARGE INSTRUCTIONS
Please leave Meadows catheter in place.  Monday morning call urology to set up a appointment for catheter removal and testing following catheter removal.  If having fevers, chills, nausea, vomiting or other acute concerns return to the ER.  Start Flomax to help with the Meadows catheter removal.

## 2020-02-03 ENCOUNTER — DOCUMENTATION ONLY (OUTPATIENT)
Dept: CARE COORDINATION | Facility: CLINIC | Age: 70
End: 2020-02-03

## 2020-02-03 ENCOUNTER — TELEPHONE (OUTPATIENT)
Dept: UROLOGY | Facility: CLINIC | Age: 70
End: 2020-02-03

## 2020-02-03 LAB — INTERPRETATION ECG - MUSE: NORMAL

## 2020-02-03 NOTE — TELEPHONE ENCOUNTER
MEDICAL RECORDS REQUEST   [facility]  Urology Clinic  [address] [place] [address]  PHONE: [phone]  Fax: [phone]        FUTURE VISIT INFORMATION                                                   Kimo Araya : 1950 scheduled for future visit at [facility]    APPOINTMENT INFORMATION:    Date: 20 11:30AM    Provider:  Angela Lee PA-C    Reason for Visit/Diagnosis: Catheter removal     REFERRAL INFORMATION:    Referring provider:  Self    Specialty: N/A    Referring providers clinic:  N/A    Clinic contact number:  N/A    RECORDS REQUESTED FOR VISIT                                                     NOTES  STATUS/DETAILS   OFFICE NOTE from referring provider  no   OFFICE NOTE from other specialist  no   DISCHARGE SUMMARY from hospital  yes   DISCHARGE REPORT from the ER  yes   OPERATIVE REPORT  no   MEDICATION LIST  yes     PRE-VISIT CHECKLIST      Record collection complete Yes- Internal recs in epic    Appointment appropriately scheduled           (right time/right provider) Yes   MyChart activation If no, please explain: In process    Questionnaire complete If no, please explain: In process      Completed by: Aspen Simon

## 2020-02-03 NOTE — TELEPHONE ENCOUNTER
[facility] Call Center    Phone Message    May a detailed message be left on voicemail: yes    Reason for Call: Other: Gian had hernia surgery on 1-31 at [facility] and needs cath removal, possibly 2-4 they said.  Please call patient to schedule, will go to any clinic.      Action Taken: Message routed to:  [facility] ([facility] Uro

## 2020-02-04 ENCOUNTER — OFFICE VISIT (OUTPATIENT)
Dept: UROLOGY | Facility: CLINIC | Age: 70
End: 2020-02-04
Payer: COMMERCIAL

## 2020-02-04 ENCOUNTER — PRE VISIT (OUTPATIENT)
Dept: UROLOGY | Facility: CLINIC | Age: 70
End: 2020-02-04

## 2020-02-04 VITALS
HEART RATE: 85 BPM | BODY MASS INDEX: 27.77 KG/M2 | SYSTOLIC BLOOD PRESSURE: 168 MMHG | WEIGHT: 205 LBS | DIASTOLIC BLOOD PRESSURE: 98 MMHG | HEIGHT: 72 IN

## 2020-02-04 DIAGNOSIS — R33.9 URINARY RETENTION: Primary | ICD-10-CM

## 2020-02-04 RX ORDER — CIPROFLOXACIN 500 MG/1
500 TABLET, FILM COATED ORAL ONCE
Status: COMPLETED | OUTPATIENT
Start: 2020-02-04 | End: 2020-02-04

## 2020-02-04 RX ORDER — TAMSULOSIN HYDROCHLORIDE 0.4 MG/1
0.4 CAPSULE ORAL DAILY
Qty: 45 CAPSULE | Refills: 2 | Status: SHIPPED | OUTPATIENT
Start: 2020-02-04 | End: 2020-03-17

## 2020-02-04 RX ORDER — TAMSULOSIN HYDROCHLORIDE 0.4 MG/1
0.4 CAPSULE ORAL DAILY
Qty: 45 CAPSULE | Refills: 2 | Status: SHIPPED | OUTPATIENT
Start: 2020-02-04 | End: 2020-02-04

## 2020-02-04 RX ADMIN — CIPROFLOXACIN 500 MG: 500 TABLET, FILM COATED ORAL at 12:03

## 2020-02-04 ASSESSMENT — ENCOUNTER SYMPTOMS
DYSURIA: 0
HEARTBURN: 0
ABDOMINAL PAIN: 1
HEMATURIA: 0
NAUSEA: 0
CONSTIPATION: 1
VOMITING: 0
BLOATING: 0

## 2020-02-04 ASSESSMENT — MIFFLIN-ST. JEOR: SCORE: 1742.87

## 2020-02-04 ASSESSMENT — PAIN SCALES - GENERAL: PAINLEVEL: MILD PAIN (2)

## 2020-02-04 NOTE — LETTER
2/4/2020     RE: Kimo Araya  [address] [place] [address]     Dear Colleague,    Thank you for referring your patient, Kimo Araya, to the [facility]. Please see a copy of my visit note below.    It was my pleasure to meet Mr. Kimo Araya, a 69 year old year old male seen in consultation today at the request of Dr. Trejo for chief complaint: Consult (Post op urinary retention)    HPI: Mr. Kimo Araya has PMH significant for ventral herniorrhaphy on 1/31/20 with Dr. Trejo (same day surgery).  He was discharged without a Meadows but presented to the [facility] ED on 2/1/20 in urinary retention (PVR 800cc by bladderscan).  A Meadows was placed and he was discharged on Flomax. His UA on that date was normal.      Today he presents for a voiding trial.  PTA he acknowledged a slower stream but felt he was emptying fully.  No frequency, nocturia, incontinence or nocturnal enuresis.  No hematuria, stones, UTIs.  Recovering normally from his herniorrhaphy.    - Has been having normal BMs (last 2 days ago)  - No narcotics  - No antibiotics  - Last PSA 3/16/07    Past Medical History:   Diagnosis Date     Actinic keratoses on his face. 2008     Allergic rhinitis, cause unspecified     Allergy, airborne subst       Past Surgical History:   Procedure Laterality Date     HC COLONOSCOPY W/WO BRUSH/WASH  4/27/07     HERNIORRHAPHY VENTRAL N/A 1/31/2020    Procedure: VENTRAL HERNIA REPAIR;  Surgeon: Love Trejo MD;  Location: [facility] OR   Detached retina surgery right - Hockey trauma  Left eye surgery - Hockey trauma      FAMILY HISTORY: Denies family history of urologic cancer.  dad - heart disease  Brother - eye cancer  Mom - Lung cancer - non-smoker    SOCIAL HISTORY: Retired [employer]  Regularly exercises, likes to ice skate   reports that he has never smoked. He has never used smokeless  tobacco.    Current Outpatient Medications   Medication Sig Dispense Refill     acetaminophen (TYLENOL) 325 MG tablet Take 3 tablets (975 mg) by mouth every 6 hours as needed for pain Do not exceed more than 4 doses in 24 hours. 100 tablet 0     ASPIRIN 81 MG OR CPEP 1 CAPSULE DAILY       calcium carbonate (OS-RITIKA) 500 MG tablet Take 1 tablet by mouth daily       ibuprofen (ADVIL/MOTRIN) 200 MG tablet Take 3 tablets (600 mg) by mouth every 6 hours as needed for pain 100 tablet 0     MULTIVITAMINS OR Take 1 tablet by mouth daily        oxyCODONE (ROXICODONE) 5 MG tablet Take 1-2 tablets (5-10 mg) by mouth every 4 hours as needed for moderate to severe pain 12 tablet 0     senna-docusate (SENOKOT-S/PERICOLACE) 8.6-50 MG tablet Take 1-2 tablets by mouth 2 times daily Use while taking narcotic pain medication to prevent constipation. 30 tablet 0     tamsulosin (FLOMAX) 0.4 MG capsule Take 1 capsule (0.4 mg) by mouth daily for 7 days 7 capsule 0     VITAMIN D 1000 UNIT OR TABS Take 2,000 Units by mouth daily          ALLERGIES: Nkda [no known drug allergies] and Seasonal allergies      REVIEW OF SYSTEMS:  Answers for HPI/ROS submitted by the patient on 2/4/2020   General Symptoms: No  Skin Symptoms: No  HENT Symptoms: No  EYE SYMPTOMS: No  HEART SYMPTOMS: No  LUNG SYMPTOMS: No  INTESTINAL SYMPTOMS: Yes  URINARY SYMPTOMS: Yes  REPRODUCTIVE SYMPTOMS: No  SKELETAL SYMPTOMS: No  BLOOD SYMPTOMS: No  NERVOUS SYSTEM SYMPTOMS: No  MENTAL HEALTH SYMPTOMS: No  Heart burn or indigestion: No  Nausea: No  Vomiting: No  Abdominal pain: Yes  Bloating: No  Constipation: Yes  Change in stools: Yes  Trouble holding urine or incontinence: No  Pain or burning: No  Trouble starting or stopping: No  Increased frequency of urination: No  Blood in urine: No  Decreased frequency of urination: No  Frequent nighttime urination: No      GENERAL PHYSICAL EXAM:   Vitals: BP (!) 168/98   Pulse 85   Ht 1.829 m (6')   Wt 93 kg (205 lb)   BMI 27.80  kg/m     Body mass index is 27.8 kg/m .    GENERAL: Well groomed, well developed, well nourished male in NAD.  NEURO: Alert and oriented x 3.  PSYCH: Normal mood and affect, pleasant and agreeable during interview and exam.    IMAGING:  CT ABDOMEN AND PELVIS WITH CONTRAST 1/31/2020 10:53 AM      HISTORY: Abdominal distension; acute pain after coughing.      TECHNIQUE: Axial images from the lung bases to the symphysis are  performed with additional coronal reformatted images. 100 mL of Isovue  370 are given intravenously.  Radiation dose for this scan was reduced  using automated exposure control, adjustment of the mA and/or kV  according to patient size, or iterative reconstruction technique.     FINDINGS:  The lung bases are clear.     Abdomen: The liver, spleen, gallbladder, pancreas, adrenal glands and  kidneys are unremarkable. Parapelvic renal cysts are noted  bilaterally. No hydronephrosis or evidence of urinary tract calculi.  No enlarged abdominal lymph nodes. The bowel is normal in caliber  without obstruction, diverticulitis or appendicitis. There is a  midline anterior abdominal wall hernia above the level of the  umbilicus on series 2, image 31 containing a loop of small bowel. This  loop of small bowel is dilated with air-fluid level concerning for an  obstructing hernia. Minimal mesenteric inflammation is currently  evident. The more proximal small bowel loops are also mildly distended  with air-fluid levels concerning for obstruction. Colonic  interposition is noted.     Pelvis: The bladder, prostate and rectum are unremarkable. No enlarged  pelvic lymph nodes or free fluid. Bone window examination is  unremarkable.                                                                      IMPRESSION:  1. Obstructing midline ventral hernia containing a loop of small  bowel. Follow-up with general surgery for further assessment.  2. Parapelvic renal cysts. No hydronephrosis or obvious urinary  tract  calculi.    LABS: The last test results for Ms. Kimo Araya were reviewed.   PSA -   Lab Results   Component Value Date    PSA 1.17 11/20/2009    PSA 1.08 09/19/2008    PSA 1.20 03/16/2007     BMP -   Recent Labs   Lab Test 01/31/20  1001      POTASSIUM 3.9   CHLORIDE 105   CO2 26   BUN 21   CR 0.84   *   RITIKA 9.2       CBC -   Recent Labs   Lab Test 01/31/20  1001   WBC 16.4*   HGB 15.0          ASSESSMENT:   1) Urinary retention s/p herniorrhaphy 1/31/20  2) Likely BPH obstructive sxs    PLAN:   - Voiding trial today (Cipro 500mg X1 given first) - which he FAILED  - Likely just needs a little more time  - Continue Flomax (tamsulosin).   - Start straight catheterization (see instructions below)  - Follow up with me in 6 weeks.    - Will need PSA checked (? With PCP) but wait until 1 month after your last catheterization. Be aware that if you check your PSA too soon it will be artificially elevated.      [facility]    STRAIGHT CATHETERIZATION:    - Avoid constipation as this can cause you to not empty your bladder  - Minimize narcotic pain medication regimen as tolerated  - Catheterize yourself four times daily - ONLY after an attempt at spontaneous voiding is made by you.  This way you can monitor your postvoid residual (the amount left in your bladder after trying to urinate)  - Frequency of straight catheterization can be determined based on the average post-void residual:  If PVR is consistently < 150cc - no cathing needed  If PVR is 150-250cc - cath twice daily (mornings and evenings)  If PVR is 251-350cc - cath three times daily  If PVR is 351-450cc - cath four times daily (morning, lunch, dinner and before bed)    - Keep a journal of your cathing regimen after discharge (will need to include frequency of cathing and post-void residual amount obtained from straight catheterization) and bring this to clinic for review            ABELINO GroveC  Department of  Urologic Surgery

## 2020-02-04 NOTE — NURSING NOTE
Kimo Araya comes into clinic today at the request of Angela Lee PA-C for a voiding trial.    The following medication was given:     MEDICATION:  Ciprofloxacin  ROUTE: PO  SITE: Medication was given orally   DOSE: 500 mg  LOT #: [id number]  : [employer]  EXPIRATION DATE: 08/21  NDC#: 41111 070 11   Was there drug waste? No    Prior to administration, verified patient identity using patient's name and date of birth.    Drug Amount Wasted:  None.  Vial/Syringe: single      Approx 250 mL of sterile water instilled into the bladder via catheter.      Removal:  16 Fr straight tipped latex june catheter removed from urethral meatus without difficulty after removing 10 mL of fluid from the balloon, balloon intact.    Patient voided approx 175 mL of yellow urine.     Post-void residual was: 267 mL per bladder scan.    Patient tolerated procedure well.      Education: Teaching done with patient verbally as to where to go or call  if unable to urinate post-catheter removal. Increase fluids.   Plan: Pt will stay at the clinics and attempt to void with repeat PVR.      This service provided today was under the supervising provider of the day Angela Lee PA-C, who was available if needed.    Molly Steele CMA

## 2020-02-04 NOTE — PROGRESS NOTES
It was my pleasure to meet Mr. Kimo Araya, a 69 year old year old male seen in consultation today at the request of Dr. Trejo for chief complaint: Consult (Post op urinary retention)    HPI: Mr. Kimo Araya has PMH significant for ventral herniorrhaphy on 1/31/20 with Dr. Trejo (same day surgery).  He was discharged without a Meadows but presented to the [facility] ED on 2/1/20 in urinary retention (PVR 800cc by bladderscan).  A Meadows was placed and he was discharged on Flomax. His UA on that date was normal.      Today he presents for a voiding trial.  PTA he acknowledged a slower stream but felt he was emptying fully.  No frequency, nocturia, incontinence or nocturnal enuresis.  No hematuria, stones, UTIs.  Recovering normally from his herniorrhaphy.    - Has been having normal BMs (last 2 days ago)  - No narcotics  - No antibiotics  - Last PSA 3/16/07    Past Medical History:   Diagnosis Date     Actinic keratoses on his face. 2008     Allergic rhinitis, cause unspecified     Allergy, airborne subst       Past Surgical History:   Procedure Laterality Date     HC COLONOSCOPY W/WO BRUSH/WASH  4/27/07     HERNIORRHAPHY VENTRAL N/A 1/31/2020    Procedure: VENTRAL HERNIA REPAIR;  Surgeon: Love Trejo MD;  Location: [facility] OR   Detached retina surgery right - Hockey trauma  Left eye surgery - Hockey trauma      FAMILY HISTORY: Denies family history of urologic cancer.  dad - heart disease  Brother - eye cancer  Mom - Lung cancer - non-smoker    SOCIAL HISTORY: Retired [employer]  Regularly exercises, likes to ice skate   reports that he has never smoked. He has never used smokeless tobacco.    Current Outpatient Medications   Medication Sig Dispense Refill     acetaminophen (TYLENOL) 325 MG tablet Take 3 tablets (975 mg) by mouth every 6 hours as needed for pain Do not exceed more than 4 doses in 24 hours. 100 tablet 0     ASPIRIN 81 MG OR CPEP 1 CAPSULE DAILY       calcium carbonate  (OS-RITIKA) 500 MG tablet Take 1 tablet by mouth daily       ibuprofen (ADVIL/MOTRIN) 200 MG tablet Take 3 tablets (600 mg) by mouth every 6 hours as needed for pain 100 tablet 0     MULTIVITAMINS OR Take 1 tablet by mouth daily        oxyCODONE (ROXICODONE) 5 MG tablet Take 1-2 tablets (5-10 mg) by mouth every 4 hours as needed for moderate to severe pain 12 tablet 0     senna-docusate (SENOKOT-S/PERICOLACE) 8.6-50 MG tablet Take 1-2 tablets by mouth 2 times daily Use while taking narcotic pain medication to prevent constipation. 30 tablet 0     tamsulosin (FLOMAX) 0.4 MG capsule Take 1 capsule (0.4 mg) by mouth daily for 7 days 7 capsule 0     VITAMIN D 1000 UNIT OR TABS Take 2,000 Units by mouth daily          ALLERGIES: Nkda [no known drug allergies] and Seasonal allergies      REVIEW OF SYSTEMS:  Answers for HPI/ROS submitted by the patient on 2/4/2020   General Symptoms: No  Skin Symptoms: No  HENT Symptoms: No  EYE SYMPTOMS: No  HEART SYMPTOMS: No  LUNG SYMPTOMS: No  INTESTINAL SYMPTOMS: Yes  URINARY SYMPTOMS: Yes  REPRODUCTIVE SYMPTOMS: No  SKELETAL SYMPTOMS: No  BLOOD SYMPTOMS: No  NERVOUS SYSTEM SYMPTOMS: No  MENTAL HEALTH SYMPTOMS: No  Heart burn or indigestion: No  Nausea: No  Vomiting: No  Abdominal pain: Yes  Bloating: No  Constipation: Yes  Change in stools: Yes  Trouble holding urine or incontinence: No  Pain or burning: No  Trouble starting or stopping: No  Increased frequency of urination: No  Blood in urine: No  Decreased frequency of urination: No  Frequent nighttime urination: No      GENERAL PHYSICAL EXAM:   Vitals: BP (!) 168/98   Pulse 85   Ht 1.829 m (6')   Wt 93 kg (205 lb)   BMI 27.80 kg/m    Body mass index is 27.8 kg/m .    GENERAL: Well groomed, well developed, well nourished male in NAD.  NEURO: Alert and oriented x 3.  PSYCH: Normal mood and affect, pleasant and agreeable during interview and exam.    IMAGING:  CT ABDOMEN AND PELVIS WITH CONTRAST 1/31/2020 10:53 AM      HISTORY:  Abdominal distension; acute pain after coughing.      TECHNIQUE: Axial images from the lung bases to the symphysis are  performed with additional coronal reformatted images. 100 mL of Isovue  370 are given intravenously.  Radiation dose for this scan was reduced  using automated exposure control, adjustment of the mA and/or kV  according to patient size, or iterative reconstruction technique.     FINDINGS:  The lung bases are clear.     Abdomen: The liver, spleen, gallbladder, pancreas, adrenal glands and  kidneys are unremarkable. Parapelvic renal cysts are noted  bilaterally. No hydronephrosis or evidence of urinary tract calculi.  No enlarged abdominal lymph nodes. The bowel is normal in caliber  without obstruction, diverticulitis or appendicitis. There is a  midline anterior abdominal wall hernia above the level of the  umbilicus on series 2, image 31 containing a loop of small bowel. This  loop of small bowel is dilated with air-fluid level concerning for an  obstructing hernia. Minimal mesenteric inflammation is currently  evident. The more proximal small bowel loops are also mildly distended  with air-fluid levels concerning for obstruction. Colonic  interposition is noted.     Pelvis: The bladder, prostate and rectum are unremarkable. No enlarged  pelvic lymph nodes or free fluid. Bone window examination is  unremarkable.                                                                      IMPRESSION:  1. Obstructing midline ventral hernia containing a loop of small  bowel. Follow-up with general surgery for further assessment.  2. Parapelvic renal cysts. No hydronephrosis or obvious urinary tract  calculi.    LABS: The last test results for Ms. Kimo Araya were reviewed.   PSA -   Lab Results   Component Value Date    PSA 1.17 11/20/2009    PSA 1.08 09/19/2008    PSA 1.20 03/16/2007     BMP -   Recent Labs   Lab Test 01/31/20  1001      POTASSIUM 3.9   CHLORIDE 105   CO2 26   BUN 21   CR 0.84    *   RITIKA 9.2       CBC -   Recent Labs   Lab Test 01/31/20  1001   WBC 16.4*   HGB 15.0          ASSESSMENT:   1) Urinary retention s/p herniorrhaphy 1/31/20  2) Likely BPH obstructive sxs    PLAN:   - Voiding trial today (Cipro 500mg X1 given first) - which he FAILED  - Likely just needs a little more time  - Continue Flomax (tamsulosin).   - Start straight catheterization (see instructions below)  - Follow up with me in 6 weeks.    - Will need PSA checked (? With PCP) but wait until 1 month after your last catheterization. Be aware that if you check your PSA too soon it will be artificially elevated.      [facility]    STRAIGHT CATHETERIZATION:    - Avoid constipation as this can cause you to not empty your bladder  - Minimize narcotic pain medication regimen as tolerated  - Catheterize yourself four times daily - ONLY after an attempt at spontaneous voiding is made by you.  This way you can monitor your postvoid residual (the amount left in your bladder after trying to urinate)  - Frequency of straight catheterization can be determined based on the average post-void residual:  If PVR is consistently < 150cc - no cathing needed  If PVR is 150-250cc - cath twice daily (mornings and evenings)  If PVR is 251-350cc - cath three times daily  If PVR is 351-450cc - cath four times daily (morning, lunch, dinner and before bed)    - Keep a journal of your cathing regimen after discharge (will need to include frequency of cathing and post-void residual amount obtained from straight catheterization) and bring this to clinic for review            Angela Lee PA-C  Department of Urologic Surgery

## 2020-02-04 NOTE — PROGRESS NOTES
[facility]    Once completed please fax to [phone]  E-script to [email or web address]    A. Please include patient demographics and chart notes (ex: indefinite retention) with this order     Name: Kimo Araya   : 1950   Phone: [phone]   [facility] [address] [facility] [place] [address]    B. Diagnosis     Retention of urine (788.20/R33.9)   Urinary Incontinence (788.30/R30)   X Incomplete Bladder Emptying (788.21/R39-14)   Urge Incontinence (788.31/N39.41)    Other Specified Retention of Urine (788.29/R33.8)   Other:     C. Order Information/Start Date: 20    Number of Refills: PRN - 11  Length of Need: lifetime months    x Patient may receive up to a 90-day supply at one time; therefore, quantity will be three (3) times amount below.    Type (check one): x Hydrophilic    Silicone    Red Rubber    PVC Clear                                      CHECK PRODUCT [place] FREQ OF USE QUANTITY PER STRAIGHT  COUDE    ONE  SIZE PER DAY MONTH TO DISPESE     x Intermittent Catheter 16 4 120  x    Intermittent Catheter with         Insertion Supplies          Condom or External Catheters          ADDITIONAL PRODUCTS/ITEMS ORDERED (check all that apply)     PRODUCT FREQ OF USE QUANTITY PER  _X_ Patient Choice     PER MONTH MONTH TO DISPENSE  ___ Bard   x Tube of Lubricant  120 prn  ___ Coloplast    Leg Bags    ___ Cure Medical    Night Bags    ___ GentleCath    Penile Clamp (Cunningham)    ___ Hi-Slip    Disinfection/BZK-PDI Wipes    ___ Ant    Meadows Insertion Tray    ___Lo Fric    Vacuum Erection Device    ___Magic3    Meadows Catheters:    ___ MTG    Straight    ___ [place]-Teleflex    Coude    ___ SpediCath    Turkmen size        Balloon size         D. Physician's Information:      Physician's Name: Angela Lee PA-C  Phone: [phone]    Date: 20    [facility]  [address] [place] [address]  [facility] [address]  [place], [place], [address]    Sánchez Monteiro,   Office: [phone]  Mobile: [phone]  Fax: [phone]  [email or web address] [address].[email or web address]

## 2020-02-04 NOTE — NURSING NOTE
Chief Complaint   Patient presents with     Consult     Post op urinary retention       Blood pressure (!) 168/98, pulse 85, height 1.829 m (6'), weight 93 kg (205 lb). Body mass index is 27.8 kg/m .    Patient Active Problem List   Diagnosis     Mixed hyperlipidemia     allergic rhinitis       Allergies   Allergen Reactions     Nkda [No Known Drug Allergies]      Seasonal Allergies        Current Outpatient Medications   Medication Sig Dispense Refill     acetaminophen (TYLENOL) 325 MG tablet Take 3 tablets (975 mg) by mouth every 6 hours as needed for pain Do not exceed more than 4 doses in 24 hours. 100 tablet 0     ASPIRIN 81 MG OR CPEP 1 CAPSULE DAILY       calcium carbonate (OS-RITIKA) 500 MG tablet Take 1 tablet by mouth daily       ibuprofen (ADVIL/MOTRIN) 200 MG tablet Take 3 tablets (600 mg) by mouth every 6 hours as needed for pain 100 tablet 0     MULTIVITAMINS OR Take 1 tablet by mouth daily        oxyCODONE (ROXICODONE) 5 MG tablet Take 1-2 tablets (5-10 mg) by mouth every 4 hours as needed for moderate to severe pain 12 tablet 0     senna-docusate (SENOKOT-S/PERICOLACE) 8.6-50 MG tablet Take 1-2 tablets by mouth 2 times daily Use while taking narcotic pain medication to prevent constipation. 30 tablet 0     tamsulosin (FLOMAX) 0.4 MG capsule Take 1 capsule (0.4 mg) by mouth daily for 7 days 7 capsule 0     VITAMIN D 1000 UNIT OR TABS Take 2,000 Units by mouth daily          Social History     Tobacco Use     Smoking status: Never Smoker     Smokeless tobacco: Never Used     Tobacco comment: no 2nd hand   Substance Use Topics     Alcohol use: No     Drug use: None       Molly Steele CMA  2/4/2020  11:19 AM

## 2020-02-04 NOTE — NURSING NOTE
Following clinic protocol I instructed Kimo Araya in C[facility]. Kimo was able to demonstrate good hand hygiene and genital hygiene. He was able to self catheterize without difficulty using a 16 fr coude tip catheter for chronic urinary retention. Pt can not pass a straight tipped catheter due to anatomy. Pt has indefinite urinary retention.    Pt instructed to catheterize 4 times daily using a 16 Fr coude tipped catheter for urinary retention.

## 2020-02-04 NOTE — PATIENT INSTRUCTIONS
- Voiding trial today (Cipro 500mg X1 given first)  - Continue Flomax (tamsulosin).   - Start straight catheterization (see instructions below)  - Follow up with me in 6 weeks.    - Will need PSA checked (? With PCP) but wait until 1 month after your last catheterization. Be aware that if you check your PSA too soon it will be artificially elevated.     [facility]    STRAIGHT CATHETERIZATION:    - Avoid constipation as this can cause you to not empty your bladder  - Minimize narcotic pain medication regimen as tolerated  - Catheterize yourself four times daily - ONLY after an attempt at spontaneous voiding is made by you.  This way you can monitor your postvoid residual (the amount left in your bladder after trying to urinate)  - Frequency of straight catheterization can be determined based on the average post-void residual:  If PVR is consistently < 150cc - no cathing needed  If PVR is 150-250cc - cath twice daily (mornings and evenings)  If PVR is 251-350cc - cath three times daily  If PVR is 351-450cc - cath four times daily (morning, lunch, dinner and before bed)    - Keep a journal of your cathing regimen after discharge (will need to include frequency of cathing and post-void residual amount obtained from straight catheterization) and bring this to clinic for review

## 2020-02-04 NOTE — PROGRESS NOTES
Taking flomax  Prior to surgery.  Stream is reduced over the years. Was emptying.   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    No hematuria,. Stpmes./ UTIs.    Works out, ice skates.    Bowel movements - 2d history of normalized  Narcotics - none.   Antibiotics    FamHx:   dad - heart disease  Brother - eye cancer  Mom - Lung cancer - non-smoker    Last PSA 3/16/07    F/u   PCP with [facility] for PSA  Cipro x 1

## 2020-02-06 ENCOUNTER — TELEPHONE (OUTPATIENT)
Dept: UROLOGY | Facility: CLINIC | Age: 70
End: 2020-02-06

## 2020-02-18 ENCOUNTER — OFFICE VISIT (OUTPATIENT)
Dept: SURGERY | Facility: CLINIC | Age: 70
End: 2020-02-18
Payer: COMMERCIAL

## 2020-02-18 VITALS
RESPIRATION RATE: 16 BRPM | HEART RATE: 58 BPM | HEIGHT: 72 IN | OXYGEN SATURATION: 97 % | BODY MASS INDEX: 27.77 KG/M2 | DIASTOLIC BLOOD PRESSURE: 82 MMHG | SYSTOLIC BLOOD PRESSURE: 138 MMHG | WEIGHT: 205 LBS

## 2020-02-18 DIAGNOSIS — Z09 SURGICAL FOLLOWUP VISIT: Primary | ICD-10-CM

## 2020-02-18 PROCEDURE — 99024 POSTOP FOLLOW-UP VISIT: CPT | Performed by: SURGERY

## 2020-02-18 ASSESSMENT — MIFFLIN-ST. JEOR: SCORE: 1742.87

## 2020-02-18 NOTE — PROGRESS NOTES
Subjective:  Kimo is here for his first postoperative visit. He underwent a primary epigastric hernia repair without mesh on 1/31/20. Today he  tells me he is doing quite well. He currently has no pain, he is eating a normal diet and his bowel movements are normal. He actually feels that his low back pain has improved and that he is better able to bend over after his hernia repair.    Objective:  Abd - soft, non-tender, non-distended, +BS  Inc - c/d/i, healing well, no erythema, +normal healing ridge, no masses    Plan:  Activity limitations reviewed  RTC PRN    Love Trejo MD      Please route or send letter to:  Primary Care Provider (PCP)

## 2020-03-06 ENCOUNTER — PRE VISIT (OUTPATIENT)
Dept: UROLOGY | Facility: CLINIC | Age: 70
End: 2020-03-06

## 2020-03-06 NOTE — TELEPHONE ENCOUNTER
Chief Complaint : Follow up - 6 Weeks     Hx/Sx: Urinary retention (s/p herniorrhaphy 1/31/20)    Records/Orders: PSA (waiting until 1 month after your last catheterization)    Pt Contacted: N/a    At Rooming: Investigate catheterization, PVR    Aaron Espinal, EMT

## 2020-03-16 ENCOUNTER — TELEPHONE (OUTPATIENT)
Dept: UROLOGY | Facility: CLINIC | Age: 70
End: 2020-03-16

## 2020-03-17 ENCOUNTER — VIRTUAL VISIT (OUTPATIENT)
Dept: UROLOGY | Facility: CLINIC | Age: 70
End: 2020-03-17
Payer: COMMERCIAL

## 2020-03-17 DIAGNOSIS — R33.9 URINARY RETENTION: ICD-10-CM

## 2020-03-17 RX ORDER — TAMSULOSIN HYDROCHLORIDE 0.4 MG/1
0.4 CAPSULE ORAL DAILY
Qty: 90 CAPSULE | Refills: 3 | Status: SHIPPED | OUTPATIENT
Start: 2020-03-17 | End: 2020-05-08

## 2020-03-17 NOTE — PROGRESS NOTES
"Kimo Araya is a 69 year old male who is being evaluated via a billable telephone visit.      The patient has been notified of following:     \"This telephone visit will be conducted via a call between you and your physician/provider. We have found that certain health care needs can be provided without the need for a physical exam.  This service lets us provide the care you need with a short phone conversation.  If a prescription is necessary we can send it directly to your pharmacy.  If lab work is needed we can place an order for that and you can then stop by our lab to have the test done at a later time.    If during the course of the call the physician/provider feels a telephone visit is not appropriate, you will not be charged for this service.\"       Kimo Araya complains of    Chief Complaint   Patient presents with     Follow Up     herniorrhaphy     HPI: Mr. Kimo Araya is an otherwise healthy 69M s/p ventral herniorrhaphy on 1/31/20 with Dr. Trejo (same day surgery) who developed postop urinary retention requiring [place] replacement in the ED.  He saw me in clinic on 2/4/20 and failed his voiding trial thus was continued on Flomax 0.4mg daily and started on CIC with instructions to stop once he is catheterizing once daily and PVR is consistently <150cc.      Today he is scheduled for a phone consultation.  He tells me his last CIC was 2/14/20. Since then his stream has been great and he feels he is voiding normally without dysuria or hematuria.  In fact his stream is better than it was prior to surgery and he acknowledges there was likely some component of BPH, thus Flomax has been very helpful to him.  He wants to continue.    Saw Dr. Trejo (gensur) and has been cleared to return to full activity.   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  Once COVID settles, he will ask his PCP to check his PSA.      I have reviewed and updated the patient's Past Medical History, Social History, Family History and Medication List.    ALLERGIES  Nkda [no known drug allergies] and Seasonal allergies      Assessment/Plan:  1) Urinary retention likely 2/2 BPH    - Will refill Flomax x 1 year  - Needs prostate cancer screening. Prefers to follow up with PCP for PSA  - May return to [facility] (vs PCP) in 1 year to reassess voiding symptoms and consider flomax refill.     I have reviewed the note as documented above.  This accurately captures the substance of my conversation with the patient.    Phone call contact time  Call Started at 15:28  Call Ended at 15:37    [facility]

## 2020-05-08 ENCOUNTER — MYC REFILL (OUTPATIENT)
Dept: UROLOGY | Facility: CLINIC | Age: 70
End: 2020-05-08

## 2020-05-08 DIAGNOSIS — R33.9 URINARY RETENTION: ICD-10-CM

## 2020-05-08 RX ORDER — TAMSULOSIN HYDROCHLORIDE 0.4 MG/1
0.4 CAPSULE ORAL DAILY
Qty: 90 CAPSULE | Refills: 3 | Status: SHIPPED | OUTPATIENT
Start: 2020-05-08

## 2020-10-20 NOTE — ANESTHESIA CARE TRANSFER NOTE
Patient: Kimo Araya    Procedure(s):  VENTRAL HERNIA REPAIR    Diagnosis: Incarcerated ventral hernia [K43.6]  Diagnosis Additional Information: No value filed.    Anesthesia Type:   General, LMA     Note:  Airway :Face Mask  Patient transferred to:PACU  Handoff Report: Identifed the Patient, Identified the Reponsible Provider, Reviewed the pertinent medical history, Discussed the surgical course, Reviewed Intra-OP anesthesia mangement and issues during anesthesia, Set expectations for post-procedure period and Allowed opportunity for questions and acknowledgement of understanding      Vitals: (Last set prior to Anesthesia Care Transfer)    CRNA VITALS  1/31/2020 1503 - 1/31/2020 1537      1/31/2020             Resp Rate (observed):  (!) 6                Electronically Signed By: STEPHEN Boone CRNA  January 31, 2020  3:37 PM   Your Covid-19 test resulted not detected/negative. What happens if I have a negative test?    Remember to wash your hands often, avoid touching your face, stay 6 feet from people you do not live with, and wear a cloth facemask when you go out in public. A negative COVID-19 test at one point in time does not mean you will stay negative. You could become ill with COVID-19 and/or test positive at any time. If you are a close contact of a confirmed or suspected case, continue to stay home and away from others until 14 days after your last exposure. If you do not have symptoms, and were not in close contact with a confirmed or suspected case, you can stop isolating. If you currently have symptoms of COVID-19, and were not in close contact with a confirmed or suspected case, you should keep monitoring symptoms and talk to your doctor or other healthcare provider about staying home and if you need to get tested again. If you develop symptoms of COVID-19, stay at home and away from others and talk to your doctor or other healthcare provider about getting tested again. For additional information, visit coronavirus. [place].gov. For answers to your COVID-19 questions, call [phone]-[facility] ([phone]).

## 2021-01-15 ENCOUNTER — HEALTH MAINTENANCE LETTER (OUTPATIENT)
Age: 71
End: 2021-01-15

## 2021-10-24 ENCOUNTER — HEALTH MAINTENANCE LETTER (OUTPATIENT)
Age: 71
End: 2021-10-24

## 2022-02-13 ENCOUNTER — HEALTH MAINTENANCE LETTER (OUTPATIENT)
Age: 72
End: 2022-02-13

## 2022-10-16 ENCOUNTER — HEALTH MAINTENANCE LETTER (OUTPATIENT)
Age: 72
End: 2022-10-16

## 2023-03-26 ENCOUNTER — HEALTH MAINTENANCE LETTER (OUTPATIENT)
Age: 73
End: 2023-03-26

## 2024-06-01 ENCOUNTER — HEALTH MAINTENANCE LETTER (OUTPATIENT)
Age: 74
End: 2024-06-01

## (undated) DEVICE — SU VICRYL 3-0 SH 27" UND J416H

## (undated) DEVICE — GLOVE PROTEXIS BLUE W/NEU-THERA 6.5  2D73EB65

## (undated) DEVICE — SU PDS II 0 CT-2 27" Z334H

## (undated) DEVICE — NDL 22GA 1.5"

## (undated) DEVICE — BLADE CLIPPER 3M 9670

## (undated) DEVICE — BNDG ABDOMINAL BINDER 9X30-45" 79-89070

## (undated) DEVICE — LINEN HALF SHEET 5512

## (undated) DEVICE — PACK MINOR CUSTOM RIDGES SBA32RMRMA

## (undated) DEVICE — SU VICRYL 3-0 TIE 12X18" J904T

## (undated) DEVICE — ESU GROUND PAD ADULT W/CORD E7507

## (undated) DEVICE — GLOVE PROTEXIS W/NEU-THERA 6.5  2D73TE65

## (undated) DEVICE — PREP CHLORAPREP 26ML TINTED ORANGE  260815

## (undated) DEVICE — ADH SKIN CLOSURE PREMIERPRO EXOFIN MICOR HV 0.5ML 3471

## (undated) DEVICE — SOL NACL 0.9% IRRIG 1000ML BOTTLE 2F7124

## (undated) DEVICE — BAG CLEAR TRASH 1.3M 39X33" P4040C

## (undated) DEVICE — DRSG TELFA 2X3"

## (undated) DEVICE — DRAPE LAP W/ARMBOARD 29410

## (undated) DEVICE — LINEN TOWEL PACK X10 5473

## (undated) DEVICE — LINEN FULL SHEET 5511

## (undated) DEVICE — SU VICRYL 4-0 PS-2 18" UND J496H

## (undated) RX ORDER — FENTANYL CITRATE 50 UG/ML
INJECTION, SOLUTION INTRAMUSCULAR; INTRAVENOUS
Status: DISPENSED
Start: 2020-01-31

## (undated) RX ORDER — BUPIVACAINE HYDROCHLORIDE 5 MG/ML
INJECTION, SOLUTION EPIDURAL; INTRACAUDAL
Status: DISPENSED
Start: 2020-01-31

## (undated) RX ORDER — GLYCOPYRROLATE 0.2 MG/ML
INJECTION INTRAMUSCULAR; INTRAVENOUS
Status: DISPENSED
Start: 2020-01-31

## (undated) RX ORDER — CEFAZOLIN SODIUM 2 G/100ML
INJECTION, SOLUTION INTRAVENOUS
Status: DISPENSED
Start: 2020-01-31

## (undated) RX ORDER — CIPROFLOXACIN 500 MG/1
TABLET, FILM COATED ORAL
Status: DISPENSED
Start: 2020-02-04